# Patient Record
Sex: MALE | Race: BLACK OR AFRICAN AMERICAN | NOT HISPANIC OR LATINO | URBAN - METROPOLITAN AREA
[De-identification: names, ages, dates, MRNs, and addresses within clinical notes are randomized per-mention and may not be internally consistent; named-entity substitution may affect disease eponyms.]

---

## 2017-08-14 ENCOUNTER — OUTPATIENT (OUTPATIENT)
Dept: OUTPATIENT SERVICES | Facility: HOSPITAL | Age: 62
LOS: 1 days | Discharge: HOME | End: 2017-08-14
Payer: MEDICAID

## 2017-08-14 VITALS
WEIGHT: 294.98 LBS | HEART RATE: 62 BPM | TEMPERATURE: 97 F | RESPIRATION RATE: 17 BRPM | DIASTOLIC BLOOD PRESSURE: 103 MMHG | HEIGHT: 73.5 IN | SYSTOLIC BLOOD PRESSURE: 150 MMHG | OXYGEN SATURATION: 97 %

## 2017-08-14 DIAGNOSIS — I50.9 HEART FAILURE, UNSPECIFIED: ICD-10-CM

## 2017-08-14 DIAGNOSIS — M25.579 PAIN IN UNSPECIFIED ANKLE AND JOINTS OF UNSPECIFIED FOOT: ICD-10-CM

## 2017-08-14 DIAGNOSIS — M24.872 OTHER SPECIFIC JOINT DERANGEMENTS OF LEFT ANKLE, NOT ELSEWHERE CLASSIFIED: ICD-10-CM

## 2017-08-14 DIAGNOSIS — I10 ESSENTIAL (PRIMARY) HYPERTENSION: ICD-10-CM

## 2017-08-14 DIAGNOSIS — J45.909 UNSPECIFIED ASTHMA, UNCOMPLICATED: ICD-10-CM

## 2017-08-14 DIAGNOSIS — G47.30 SLEEP APNEA, UNSPECIFIED: ICD-10-CM

## 2017-08-14 DIAGNOSIS — N40.0 BENIGN PROSTATIC HYPERPLASIA WITHOUT LOWER URINARY TRACT SYMPTOMS: ICD-10-CM

## 2017-08-14 DIAGNOSIS — Z98.84 BARIATRIC SURGERY STATUS: Chronic | ICD-10-CM

## 2017-08-14 DIAGNOSIS — Z98.890 OTHER SPECIFIED POSTPROCEDURAL STATES: Chronic | ICD-10-CM

## 2017-08-14 DIAGNOSIS — Z01.818 ENCOUNTER FOR OTHER PREPROCEDURAL EXAMINATION: ICD-10-CM

## 2017-08-14 LAB
ANION GAP SERPL CALC-SCNC: 9 MMOL/L — SIGNIFICANT CHANGE UP (ref 5–17)
APTT BLD: 32.7 SEC — SIGNIFICANT CHANGE UP (ref 27.5–37.4)
BUN SERPL-MCNC: 15 MG/DL — SIGNIFICANT CHANGE UP (ref 7–23)
CALCIUM SERPL-MCNC: 8.6 MG/DL — SIGNIFICANT CHANGE UP (ref 8.5–10.1)
CHLORIDE SERPL-SCNC: 106 MMOL/L — SIGNIFICANT CHANGE UP (ref 96–108)
CO2 SERPL-SCNC: 28 MMOL/L — SIGNIFICANT CHANGE UP (ref 22–31)
CREAT SERPL-MCNC: 1.21 MG/DL — SIGNIFICANT CHANGE UP (ref 0.5–1.3)
EOSINOPHIL NFR BLD AUTO: 12 % — HIGH (ref 0–6)
GLUCOSE SERPL-MCNC: 106 MG/DL — HIGH (ref 70–99)
HCT VFR BLD CALC: 42.9 % — SIGNIFICANT CHANGE UP (ref 39–50)
HGB BLD-MCNC: 14 G/DL — SIGNIFICANT CHANGE UP (ref 13–17)
INR BLD: 0.97 RATIO — SIGNIFICANT CHANGE UP (ref 0.88–1.16)
LYMPHOCYTES # BLD AUTO: 35 % — SIGNIFICANT CHANGE UP (ref 13–44)
MCHC RBC-ENTMCNC: 30 PG — SIGNIFICANT CHANGE UP (ref 27–34)
MCHC RBC-ENTMCNC: 32.6 GM/DL — SIGNIFICANT CHANGE UP (ref 32–36)
MCV RBC AUTO: 91.8 FL — SIGNIFICANT CHANGE UP (ref 80–100)
MONOCYTES NFR BLD AUTO: 5 % — SIGNIFICANT CHANGE UP (ref 2–14)
NEUTROPHILS NFR BLD AUTO: 48 % — SIGNIFICANT CHANGE UP (ref 43–77)
PLATELET # BLD AUTO: 167 K/UL — SIGNIFICANT CHANGE UP (ref 150–400)
POTASSIUM SERPL-MCNC: 4.1 MMOL/L — SIGNIFICANT CHANGE UP (ref 3.5–5.3)
POTASSIUM SERPL-SCNC: 4.1 MMOL/L — SIGNIFICANT CHANGE UP (ref 3.5–5.3)
PROTHROM AB SERPL-ACNC: 10.6 SEC — SIGNIFICANT CHANGE UP (ref 9.8–12.7)
RBC # BLD: 4.67 M/UL — SIGNIFICANT CHANGE UP (ref 4.2–5.8)
RBC # FLD: 13.6 % — SIGNIFICANT CHANGE UP (ref 11–15)
SODIUM SERPL-SCNC: 143 MMOL/L — SIGNIFICANT CHANGE UP (ref 135–145)
WBC # BLD: 6 K/UL — SIGNIFICANT CHANGE UP (ref 3.8–10.5)
WBC # FLD AUTO: 6 K/UL — SIGNIFICANT CHANGE UP (ref 3.8–10.5)

## 2017-08-14 PROCEDURE — 93010 ELECTROCARDIOGRAM REPORT: CPT | Mod: NC

## 2017-08-14 RX ORDER — COLCHICINE 0.6 MG
1 TABLET ORAL
Qty: 0 | Refills: 0 | COMMUNITY

## 2017-08-14 RX ORDER — PREGABALIN 225 MG/1
1 CAPSULE ORAL
Qty: 0 | Refills: 0 | COMMUNITY

## 2017-08-14 RX ORDER — CHOLECALCIFEROL (VITAMIN D3) 125 MCG
1 CAPSULE ORAL
Qty: 0 | Refills: 0 | COMMUNITY

## 2017-08-14 RX ORDER — ISOSORBIDE MONONITRATE 60 MG/1
1 TABLET, EXTENDED RELEASE ORAL
Qty: 0 | Refills: 0 | COMMUNITY

## 2017-08-14 RX ORDER — LOSARTAN POTASSIUM 100 MG/1
1 TABLET, FILM COATED ORAL
Qty: 0 | Refills: 0 | COMMUNITY

## 2017-08-14 RX ORDER — MIRABEGRON 50 MG/1
1 TABLET, EXTENDED RELEASE ORAL
Qty: 0 | Refills: 0 | COMMUNITY

## 2017-08-14 RX ORDER — ALBUTEROL 90 UG/1
2 AEROSOL, METERED ORAL
Qty: 0 | Refills: 0 | COMMUNITY

## 2017-08-14 RX ORDER — PREDNISOLONE SODIUM PHOSPHATE 1 %
1 DROPS OPHTHALMIC (EYE)
Qty: 0 | Refills: 0 | COMMUNITY

## 2017-08-14 RX ORDER — ALLOPURINOL 300 MG
1 TABLET ORAL
Qty: 0 | Refills: 0 | COMMUNITY

## 2017-08-14 RX ORDER — BRIMONIDINE TARTRATE 2 MG/MG
1 SOLUTION/ DROPS OPHTHALMIC
Qty: 0 | Refills: 0 | COMMUNITY

## 2017-08-14 RX ORDER — FUROSEMIDE 40 MG
1 TABLET ORAL
Qty: 0 | Refills: 0 | COMMUNITY

## 2017-08-14 RX ORDER — FINASTERIDE 5 MG/1
1 TABLET, FILM COATED ORAL
Qty: 0 | Refills: 0 | COMMUNITY

## 2017-08-14 RX ORDER — ASPIRIN/CALCIUM CARB/MAGNESIUM 324 MG
1 TABLET ORAL
Qty: 0 | Refills: 0 | COMMUNITY

## 2017-08-14 RX ORDER — POTASSIUM CHLORIDE 20 MEQ
1 PACKET (EA) ORAL
Qty: 0 | Refills: 0 | COMMUNITY

## 2017-08-14 RX ORDER — AMLODIPINE BESYLATE 2.5 MG/1
1 TABLET ORAL
Qty: 0 | Refills: 0 | COMMUNITY

## 2017-08-14 RX ORDER — DORZOLAMIDE HYDROCHLORIDE 20 MG/ML
1 SOLUTION/ DROPS OPHTHALMIC
Qty: 0 | Refills: 0 | COMMUNITY

## 2017-08-14 RX ORDER — METOPROLOL TARTRATE 50 MG
1 TABLET ORAL
Qty: 0 | Refills: 0 | COMMUNITY

## 2017-08-14 NOTE — H&P PST ADULT - PMH
Asthma    BPH without obstruction/lower urinary tract symptoms    CHF (congestive heart failure)    Glaucoma    HTN (hypertension)    Obese    Obesity (BMI 30-39.9)    Sleep apnea

## 2017-08-21 ENCOUNTER — RESULT REVIEW (OUTPATIENT)
Age: 62
End: 2017-08-21

## 2017-08-21 ENCOUNTER — OUTPATIENT (OUTPATIENT)
Dept: OUTPATIENT SERVICES | Facility: HOSPITAL | Age: 62
LOS: 1 days | Discharge: HOME | End: 2017-08-21
Payer: MEDICAID

## 2017-08-21 VITALS
SYSTOLIC BLOOD PRESSURE: 133 MMHG | TEMPERATURE: 97 F | WEIGHT: 283.96 LBS | HEIGHT: 73 IN | OXYGEN SATURATION: 97 % | HEART RATE: 52 BPM | DIASTOLIC BLOOD PRESSURE: 81 MMHG | RESPIRATION RATE: 18 BRPM

## 2017-08-21 VITALS
OXYGEN SATURATION: 98 % | TEMPERATURE: 97 F | RESPIRATION RATE: 15 BRPM | SYSTOLIC BLOOD PRESSURE: 121 MMHG | HEART RATE: 60 BPM | DIASTOLIC BLOOD PRESSURE: 79 MMHG

## 2017-08-21 DIAGNOSIS — N40.0 BENIGN PROSTATIC HYPERPLASIA WITHOUT LOWER URINARY TRACT SYMPTOMS: Chronic | ICD-10-CM

## 2017-08-21 DIAGNOSIS — Z98.890 OTHER SPECIFIED POSTPROCEDURAL STATES: Chronic | ICD-10-CM

## 2017-08-21 DIAGNOSIS — Z98.84 BARIATRIC SURGERY STATUS: Chronic | ICD-10-CM

## 2017-08-21 PROCEDURE — 88304 TISSUE EXAM BY PATHOLOGIST: CPT | Mod: 26

## 2017-08-21 RX ORDER — SODIUM CHLORIDE 9 MG/ML
1000 INJECTION, SOLUTION INTRAVENOUS
Qty: 0 | Refills: 0 | Status: DISCONTINUED | OUTPATIENT
Start: 2017-08-21 | End: 2017-08-22

## 2017-08-21 RX ORDER — FENTANYL CITRATE 50 UG/ML
50 INJECTION INTRAVENOUS
Qty: 0 | Refills: 0 | Status: DISCONTINUED | OUTPATIENT
Start: 2017-08-21 | End: 2017-08-22

## 2017-08-21 RX ORDER — ACETAMINOPHEN 500 MG
1000 TABLET ORAL ONCE
Qty: 0 | Refills: 0 | Status: COMPLETED | OUTPATIENT
Start: 2017-08-21 | End: 2017-08-21

## 2017-08-21 RX ORDER — FENTANYL CITRATE 50 UG/ML
25 INJECTION INTRAVENOUS
Qty: 0 | Refills: 0 | Status: DISCONTINUED | OUTPATIENT
Start: 2017-08-21 | End: 2017-08-22

## 2017-08-21 RX ADMIN — Medication 400 MILLIGRAM(S): at 13:49

## 2017-08-21 RX ADMIN — Medication 1000 MILLIGRAM(S): at 14:26

## 2017-08-21 RX ADMIN — SODIUM CHLORIDE 75 MILLILITER(S): 9 INJECTION, SOLUTION INTRAVENOUS at 13:49

## 2017-08-21 NOTE — ASU PATIENT PROFILE, ADULT - PMH
Asthma    BPH without obstruction/lower urinary tract symptoms    CHF (congestive heart failure)    Glaucoma    Gout    HTN (hypertension)    Obese    Obesity (BMI 30-39.9)    Sleep apnea    Thyroid activity decreased    Thyroid activity decreased

## 2017-08-21 NOTE — ASU DISCHARGE PLAN (ADULT/PEDIATRIC). - NOTIFY
Pain not relieved by Medications/Numbness, color, or temperature change to extremity/Fever greater than 101/Bleeding that does not stop

## 2017-08-22 LAB — SURGICAL PATHOLOGY FINAL REPORT - CH: SIGNIFICANT CHANGE UP

## 2017-08-25 ENCOUNTER — TRANSCRIPTION ENCOUNTER (OUTPATIENT)
Age: 62
End: 2017-08-25

## 2017-08-28 DIAGNOSIS — E66.9 OBESITY, UNSPECIFIED: ICD-10-CM

## 2017-08-28 DIAGNOSIS — I10 ESSENTIAL (PRIMARY) HYPERTENSION: ICD-10-CM

## 2017-08-28 DIAGNOSIS — Z79.82 LONG TERM (CURRENT) USE OF ASPIRIN: ICD-10-CM

## 2017-08-28 DIAGNOSIS — M89.572: ICD-10-CM

## 2017-08-28 DIAGNOSIS — N40.0 BENIGN PROSTATIC HYPERPLASIA WITHOUT LOWER URINARY TRACT SYMPTOMS: ICD-10-CM

## 2017-08-28 DIAGNOSIS — J45.909 UNSPECIFIED ASTHMA, UNCOMPLICATED: ICD-10-CM

## 2017-08-28 DIAGNOSIS — M65.872 OTHER SYNOVITIS AND TENOSYNOVITIS, LEFT ANKLE AND FOOT: ICD-10-CM

## 2017-08-28 DIAGNOSIS — M25.572 PAIN IN LEFT ANKLE AND JOINTS OF LEFT FOOT: ICD-10-CM

## 2017-08-28 DIAGNOSIS — I50.9 HEART FAILURE, UNSPECIFIED: ICD-10-CM

## 2017-08-28 DIAGNOSIS — Z98.84 BARIATRIC SURGERY STATUS: ICD-10-CM

## 2017-08-28 DIAGNOSIS — M93.972 OSTEOCHONDROPATHY, UNSPECIFIED, LEFT ANKLE AND FOOT: ICD-10-CM

## 2017-08-28 DIAGNOSIS — G47.30 SLEEP APNEA, UNSPECIFIED: ICD-10-CM

## 2022-08-01 NOTE — ASU PATIENT PROFILE, ADULT - PRO TOBACCO TYPE
Quality 110: Preventive Care And Screening: Influenza Immunization: Influenza immunization was not ordered or administered, reason not given Quality 226: Preventive Care And Screening: Tobacco Use: Screening And Cessation Intervention: Patient screened for tobacco use and is an ex/non-smoker Quality 130: Documentation Of Current Medications In The Medical Record: Current Medications Documented Detail Level: Detailed Quality 431: Preventive Care And Screening: Unhealthy Alcohol Use - Screening: Patient not identified as an unhealthy alcohol user when screened for unhealthy alcohol use using a systematic screening method cigarettes

## 2023-06-22 PROBLEM — E66.9 OBESITY, UNSPECIFIED: Chronic | Status: ACTIVE | Noted: 2017-08-14

## 2023-06-22 PROBLEM — E03.9 HYPOTHYROIDISM, UNSPECIFIED: Chronic | Status: ACTIVE | Noted: 2017-08-21

## 2023-06-22 PROBLEM — N40.0 BENIGN PROSTATIC HYPERPLASIA WITHOUT LOWER URINARY TRACT SYMPTOMS: Chronic | Status: ACTIVE | Noted: 2017-08-14

## 2023-06-22 PROBLEM — G47.30 SLEEP APNEA, UNSPECIFIED: Chronic | Status: ACTIVE | Noted: 2017-08-14

## 2023-06-22 PROBLEM — H40.9 UNSPECIFIED GLAUCOMA: Chronic | Status: ACTIVE | Noted: 2017-08-14

## 2023-06-22 PROBLEM — I10 ESSENTIAL (PRIMARY) HYPERTENSION: Chronic | Status: ACTIVE | Noted: 2017-08-14

## 2023-06-22 PROBLEM — J45.909 UNSPECIFIED ASTHMA, UNCOMPLICATED: Chronic | Status: ACTIVE | Noted: 2017-08-14

## 2023-06-22 PROBLEM — I50.9 HEART FAILURE, UNSPECIFIED: Chronic | Status: ACTIVE | Noted: 2017-08-14

## 2023-06-22 PROBLEM — M10.9 GOUT, UNSPECIFIED: Chronic | Status: ACTIVE | Noted: 2017-08-21

## 2023-08-18 ENCOUNTER — APPOINTMENT (OUTPATIENT)
Dept: VASCULAR SURGERY | Facility: CLINIC | Age: 68
End: 2023-08-18
Payer: MEDICARE

## 2023-08-18 VITALS
HEIGHT: 74 IN | OXYGEN SATURATION: 97 % | BODY MASS INDEX: 35.42 KG/M2 | TEMPERATURE: 98.3 F | DIASTOLIC BLOOD PRESSURE: 88 MMHG | SYSTOLIC BLOOD PRESSURE: 147 MMHG | WEIGHT: 276 LBS | HEART RATE: 76 BPM

## 2023-08-18 DIAGNOSIS — M79.604 PAIN IN RIGHT LEG: ICD-10-CM

## 2023-08-18 PROCEDURE — 99205 OFFICE O/P NEW HI 60 MIN: CPT

## 2023-08-18 NOTE — CONSULT LETTER
[Dear  ___] : Dear  [unfilled], [Consult Letter:] : I had the pleasure of evaluating your patient, [unfilled]. [Please see my note below.] : Please see my note below. [Consult Closing:] : Thank you very much for allowing me to participate in the care of this patient.  If you have any questions, please do not hesitate to contact me. [Sincerely,] : Sincerely, [FreeTextEntry1] : Mina Disla MD vascular and wound  care surgeon [phone] [FreeTextEntry3] : Mina Disla MD vascular and wound  care surgeon [phone]

## 2023-08-18 NOTE — PLAN
[TextEntry] : The plan at this stage is to perform arterial and venous Dopplers of both lower extremity to rule out DVT rule out reflux and follow the patient after the about test are done.  There is no need for any medications at this stage and no medications are prescribed.

## 2023-08-18 NOTE — ASSESSMENT
[FreeTextEntry1] : The patient has a varicose veins of the left lower extremity which are asymptomatic and mild to moderate peripheral arterial disease.  The patient's symptoms of radiating pain both legs are most likely secondary to spinal stenosis

## 2023-08-18 NOTE — PHYSICAL EXAM
[Normal Breath Sounds] : Normal breath sounds [Normal Heart Sounds] : normal heart sounds [Right Carotid Bruit] : no bruit heard over the right carotid [Left Carotid Bruit] : no bruit heard over the left carotid [2+] : left 2+ [Left Femoral Bruit] : no bruit heard over the left femoral artery [Right Femoral Bruit] : no bruit heard over the right femoral artery [0] : right 0 [1+] : right 1+ [Ankle Swelling (On Exam)] : present [Ankle Swelling Bilaterally] : bilaterally  [Varicose Veins Of Lower Extremities] : bilaterally [Ankle Swelling On The Left] : moderate [] : bilaterally [Ankle Swelling On The Right] : mild [Abdomen Masses] : No abdominal masses [Tender] : nontender [Stool Sample Taken] : No stool obtained  on rectal exam [No Rash or Lesion] : No rash or lesion [Purpura] : no purpura  [Petechiae] : no petechiae [Skin Ulcer] : no ulcer [Skin Induration] : no induration [Alert] : alert [Oriented to Person] : oriented to person [Oriented to Place] : oriented to place [Oriented to Time] : oriented to time [Calm] : calm [de-identified] : Well-built well-nourished [de-identified] : Patient is legally blind [de-identified] : Within normal limits [TextEntry] : Patient is seen and examined in sitting and standing position.  Both her lower extremities have weakly palpable pedal pulses but feet are warm to touch the patient has a mild to moderate peripheral arterial disease which relatively asymptomatic.  Patient also has blindness in both eyes. Patient has a varicose veins on left lower extremity especially on the medial calf which is consistent with large varicosity without any evidence of bleeding or thrombophlebitis.  There is no tenderness and no concern for rupture or infection.  The patient also has edema of the both ankles when it minimal.

## 2023-08-18 NOTE — HISTORY OF PRESENT ILLNESS
[FreeTextEntry1] : This is a 67-year-old male who has been referred for evaluation of bilateral lower extremity varicose veins especially on the left lower extremity.  According to the history obtained from the patient he is a legally blind and has no history of diabetes but has also noticed increasing lump in varicose veins on the left medial calf is concerned and is sent here for evaluation.  Patient has no history of DVT in the past no history of claudications in the leg or rest pains.  Patient has minimal amount of swelling on the ankles and the feet

## 2023-08-18 NOTE — REVIEW OF SYSTEMS
[As Noted in HPI] : as noted in HPI [Lower Ext Edema] : lower extremity edema [Negative] : Heme/Lymph

## 2023-09-11 ENCOUNTER — APPOINTMENT (OUTPATIENT)
Dept: ULTRASOUND IMAGING | Facility: HOSPITAL | Age: 68
End: 2023-09-11

## 2023-09-11 ENCOUNTER — OUTPATIENT (OUTPATIENT)
Dept: OUTPATIENT SERVICES | Facility: HOSPITAL | Age: 68
LOS: 1 days | Discharge: HOME | End: 2023-09-11
Payer: MEDICARE

## 2023-09-11 DIAGNOSIS — Z98.890 OTHER SPECIFIED POSTPROCEDURAL STATES: Chronic | ICD-10-CM

## 2023-09-11 DIAGNOSIS — I73.9 PERIPHERAL VASCULAR DISEASE, UNSPECIFIED: ICD-10-CM

## 2023-09-11 DIAGNOSIS — Z98.84 BARIATRIC SURGERY STATUS: Chronic | ICD-10-CM

## 2023-09-11 DIAGNOSIS — N40.0 BENIGN PROSTATIC HYPERPLASIA WITHOUT LOWER URINARY TRACT SYMPTOMS: Chronic | ICD-10-CM

## 2023-09-11 DIAGNOSIS — I83.813 VARICOSE VEINS OF BILATERAL LOWER EXTREMITIES WITH PAIN: ICD-10-CM

## 2023-09-11 PROCEDURE — 93970 EXTREMITY STUDY: CPT | Mod: 26

## 2023-09-11 PROCEDURE — 93925 LOWER EXTREMITY STUDY: CPT | Mod: 26

## 2023-10-06 ENCOUNTER — APPOINTMENT (OUTPATIENT)
Dept: VASCULAR SURGERY | Facility: CLINIC | Age: 68
End: 2023-10-06
Payer: MEDICARE

## 2023-10-06 VITALS
WEIGHT: 276 LBS | DIASTOLIC BLOOD PRESSURE: 80 MMHG | HEIGHT: 74 IN | HEART RATE: 77 BPM | OXYGEN SATURATION: 94 % | BODY MASS INDEX: 35.42 KG/M2 | SYSTOLIC BLOOD PRESSURE: 122 MMHG | TEMPERATURE: 97.6 F

## 2023-10-06 DIAGNOSIS — I73.9 PERIPHERAL VASCULAR DISEASE, UNSPECIFIED: ICD-10-CM

## 2023-10-06 DIAGNOSIS — M79.605 PAIN IN LEFT LEG: ICD-10-CM

## 2023-10-06 DIAGNOSIS — I83.813 VARICOSE VEINS OF BILATERAL LOWER EXTREMITIES WITH PAIN: ICD-10-CM

## 2023-10-06 PROCEDURE — 99213 OFFICE O/P EST LOW 20 MIN: CPT

## 2024-02-10 ENCOUNTER — INPATIENT (INPATIENT)
Facility: HOSPITAL | Age: 69
LOS: 1 days | Discharge: HOME | End: 2024-02-12
Attending: STUDENT IN AN ORGANIZED HEALTH CARE EDUCATION/TRAINING PROGRAM | Admitting: STUDENT IN AN ORGANIZED HEALTH CARE EDUCATION/TRAINING PROGRAM
Payer: MEDICARE

## 2024-02-10 VITALS
WEIGHT: 276.9 LBS | HEART RATE: 75 BPM | OXYGEN SATURATION: 100 % | TEMPERATURE: 98 F | HEIGHT: 73 IN | SYSTOLIC BLOOD PRESSURE: 157 MMHG | RESPIRATION RATE: 20 BRPM | DIASTOLIC BLOOD PRESSURE: 98 MMHG

## 2024-02-10 DIAGNOSIS — Z98.84 BARIATRIC SURGERY STATUS: Chronic | ICD-10-CM

## 2024-02-10 DIAGNOSIS — H40.9 UNSPECIFIED GLAUCOMA: ICD-10-CM

## 2024-02-10 DIAGNOSIS — I10 ESSENTIAL (PRIMARY) HYPERTENSION: ICD-10-CM

## 2024-02-10 DIAGNOSIS — Z79.890 HORMONE REPLACEMENT THERAPY: ICD-10-CM

## 2024-02-10 DIAGNOSIS — Z98.890 OTHER SPECIFIED POSTPROCEDURAL STATES: Chronic | ICD-10-CM

## 2024-02-10 DIAGNOSIS — R06.02 SHORTNESS OF BREATH: ICD-10-CM

## 2024-02-10 DIAGNOSIS — J44.9 CHRONIC OBSTRUCTIVE PULMONARY DISEASE, UNSPECIFIED: ICD-10-CM

## 2024-02-10 DIAGNOSIS — E03.9 HYPOTHYROIDISM, UNSPECIFIED: ICD-10-CM

## 2024-02-10 DIAGNOSIS — E66.2 MORBID (SEVERE) OBESITY WITH ALVEOLAR HYPOVENTILATION: ICD-10-CM

## 2024-02-10 DIAGNOSIS — E83.42 HYPOMAGNESEMIA: ICD-10-CM

## 2024-02-10 DIAGNOSIS — J44.1 CHRONIC OBSTRUCTIVE PULMONARY DISEASE WITH (ACUTE) EXACERBATION: ICD-10-CM

## 2024-02-10 DIAGNOSIS — I11.0 HYPERTENSIVE HEART DISEASE WITH HEART FAILURE: ICD-10-CM

## 2024-02-10 DIAGNOSIS — N40.0 BENIGN PROSTATIC HYPERPLASIA WITHOUT LOWER URINARY TRACT SYMPTOMS: ICD-10-CM

## 2024-02-10 DIAGNOSIS — J45.909 UNSPECIFIED ASTHMA, UNCOMPLICATED: ICD-10-CM

## 2024-02-10 DIAGNOSIS — N40.0 BENIGN PROSTATIC HYPERPLASIA WITHOUT LOWER URINARY TRACT SYMPTOMS: Chronic | ICD-10-CM

## 2024-02-10 DIAGNOSIS — R06.09 OTHER FORMS OF DYSPNEA: ICD-10-CM

## 2024-02-10 DIAGNOSIS — R79.89 OTHER SPECIFIED ABNORMAL FINDINGS OF BLOOD CHEMISTRY: ICD-10-CM

## 2024-02-10 DIAGNOSIS — M10.9 GOUT, UNSPECIFIED: ICD-10-CM

## 2024-02-10 DIAGNOSIS — I50.9 HEART FAILURE, UNSPECIFIED: ICD-10-CM

## 2024-02-10 DIAGNOSIS — Z29.9 ENCOUNTER FOR PROPHYLACTIC MEASURES, UNSPECIFIED: ICD-10-CM

## 2024-02-10 DIAGNOSIS — E87.6 HYPOKALEMIA: ICD-10-CM

## 2024-02-10 LAB
ALBUMIN SERPL ELPH-MCNC: 3.4 G/DL — SIGNIFICANT CHANGE UP (ref 3.3–5)
ALP SERPL-CCNC: 56 U/L — SIGNIFICANT CHANGE UP (ref 40–120)
ALT FLD-CCNC: 31 U/L — SIGNIFICANT CHANGE UP (ref 12–78)
ANION GAP SERPL CALC-SCNC: 3 MMOL/L — LOW (ref 5–17)
ANION GAP SERPL CALC-SCNC: 3 MMOL/L — LOW (ref 5–17)
APTT BLD: 33.8 SEC — SIGNIFICANT CHANGE UP (ref 24.5–35.6)
AST SERPL-CCNC: 30 U/L — SIGNIFICANT CHANGE UP (ref 15–37)
BASOPHILS # BLD AUTO: 0.05 K/UL — SIGNIFICANT CHANGE UP (ref 0–0.2)
BASOPHILS NFR BLD AUTO: 1.1 % — SIGNIFICANT CHANGE UP (ref 0–2)
BILIRUB SERPL-MCNC: 1.6 MG/DL — HIGH (ref 0.2–1.2)
BUN SERPL-MCNC: 15 MG/DL — SIGNIFICANT CHANGE UP (ref 7–23)
BUN SERPL-MCNC: 17 MG/DL — SIGNIFICANT CHANGE UP (ref 7–23)
CALCIUM SERPL-MCNC: 8 MG/DL — LOW (ref 8.5–10.1)
CALCIUM SERPL-MCNC: 8.2 MG/DL — LOW (ref 8.5–10.1)
CHLORIDE SERPL-SCNC: 108 MMOL/L — SIGNIFICANT CHANGE UP (ref 96–108)
CHLORIDE SERPL-SCNC: 109 MMOL/L — HIGH (ref 96–108)
CO2 SERPL-SCNC: 30 MMOL/L — SIGNIFICANT CHANGE UP (ref 22–31)
CO2 SERPL-SCNC: 31 MMOL/L — SIGNIFICANT CHANGE UP (ref 22–31)
CREAT SERPL-MCNC: 1.08 MG/DL — SIGNIFICANT CHANGE UP (ref 0.5–1.3)
CREAT SERPL-MCNC: 1.18 MG/DL — SIGNIFICANT CHANGE UP (ref 0.5–1.3)
D DIMER BLD IA.RAPID-MCNC: <150 NG/ML DDU — SIGNIFICANT CHANGE UP
EGFR: 68 ML/MIN/1.73M2 — SIGNIFICANT CHANGE UP
EGFR: 76 ML/MIN/1.73M2 — SIGNIFICANT CHANGE UP
EOSINOPHIL # BLD AUTO: 0.49 K/UL — SIGNIFICANT CHANGE UP (ref 0–0.5)
EOSINOPHIL NFR BLD AUTO: 10.7 % — HIGH (ref 0–6)
GLUCOSE SERPL-MCNC: 89 MG/DL — SIGNIFICANT CHANGE UP (ref 70–99)
GLUCOSE SERPL-MCNC: 95 MG/DL — SIGNIFICANT CHANGE UP (ref 70–99)
HCT VFR BLD CALC: 34.7 % — LOW (ref 39–50)
HGB BLD-MCNC: 11.5 G/DL — LOW (ref 13–17)
IMM GRANULOCYTES NFR BLD AUTO: 0.2 % — SIGNIFICANT CHANGE UP (ref 0–0.9)
INR BLD: 0.98 RATIO — SIGNIFICANT CHANGE UP (ref 0.85–1.18)
LYMPHOCYTES # BLD AUTO: 1.5 K/UL — SIGNIFICANT CHANGE UP (ref 1–3.3)
LYMPHOCYTES # BLD AUTO: 32.8 % — SIGNIFICANT CHANGE UP (ref 13–44)
MAGNESIUM SERPL-MCNC: 1.3 MG/DL — LOW (ref 1.6–2.6)
MCHC RBC-ENTMCNC: 30.2 PG — SIGNIFICANT CHANGE UP (ref 27–34)
MCHC RBC-ENTMCNC: 33.1 G/DL — SIGNIFICANT CHANGE UP (ref 32–36)
MCV RBC AUTO: 91.1 FL — SIGNIFICANT CHANGE UP (ref 80–100)
MONOCYTES # BLD AUTO: 0.32 K/UL — SIGNIFICANT CHANGE UP (ref 0–0.9)
MONOCYTES NFR BLD AUTO: 7 % — SIGNIFICANT CHANGE UP (ref 2–14)
NEUTROPHILS # BLD AUTO: 2.2 K/UL — SIGNIFICANT CHANGE UP (ref 1.8–7.4)
NEUTROPHILS NFR BLD AUTO: 48.2 % — SIGNIFICANT CHANGE UP (ref 43–77)
NRBC # BLD: 0 /100 WBCS — SIGNIFICANT CHANGE UP (ref 0–0)
NT-PROBNP SERPL-SCNC: 301 PG/ML — HIGH (ref 0–125)
PLATELET # BLD AUTO: 162 K/UL — SIGNIFICANT CHANGE UP (ref 150–400)
POTASSIUM SERPL-MCNC: 2.9 MMOL/L — CRITICAL LOW (ref 3.5–5.3)
POTASSIUM SERPL-MCNC: 3.7 MMOL/L — SIGNIFICANT CHANGE UP (ref 3.5–5.3)
POTASSIUM SERPL-SCNC: 2.9 MMOL/L — CRITICAL LOW (ref 3.5–5.3)
POTASSIUM SERPL-SCNC: 3.7 MMOL/L — SIGNIFICANT CHANGE UP (ref 3.5–5.3)
PROT SERPL-MCNC: 7 GM/DL — SIGNIFICANT CHANGE UP (ref 6–8.3)
PROTHROM AB SERPL-ACNC: 11.7 SEC — SIGNIFICANT CHANGE UP (ref 9.5–13)
RBC # BLD: 3.81 M/UL — LOW (ref 4.2–5.8)
RBC # FLD: 14.6 % — HIGH (ref 10.3–14.5)
SODIUM SERPL-SCNC: 142 MMOL/L — SIGNIFICANT CHANGE UP (ref 135–145)
SODIUM SERPL-SCNC: 142 MMOL/L — SIGNIFICANT CHANGE UP (ref 135–145)
TROPONIN I, HIGH SENSITIVITY RESULT: 101.3 NG/L — HIGH
TROPONIN I, HIGH SENSITIVITY RESULT: 96.3 NG/L — HIGH
WBC # BLD: 4.57 K/UL — SIGNIFICANT CHANGE UP (ref 3.8–10.5)
WBC # FLD AUTO: 4.57 K/UL — SIGNIFICANT CHANGE UP (ref 3.8–10.5)

## 2024-02-10 PROCEDURE — 71275 CT ANGIOGRAPHY CHEST: CPT | Mod: 26,MA

## 2024-02-10 PROCEDURE — 99285 EMERGENCY DEPT VISIT HI MDM: CPT

## 2024-02-10 PROCEDURE — 71045 X-RAY EXAM CHEST 1 VIEW: CPT | Mod: 26

## 2024-02-10 PROCEDURE — 93010 ELECTROCARDIOGRAM REPORT: CPT

## 2024-02-10 PROCEDURE — 99222 1ST HOSP IP/OBS MODERATE 55: CPT

## 2024-02-10 RX ORDER — BRIMONIDINE TARTRATE 2 MG/MG
1 SOLUTION/ DROPS OPHTHALMIC THREE TIMES A DAY
Refills: 0 | Status: DISCONTINUED | OUTPATIENT
Start: 2024-02-10 | End: 2024-02-12

## 2024-02-10 RX ORDER — LEVOTHYROXINE SODIUM 125 MCG
75 TABLET ORAL DAILY
Refills: 0 | Status: DISCONTINUED | OUTPATIENT
Start: 2024-02-10 | End: 2024-02-10

## 2024-02-10 RX ORDER — METOPROLOL TARTRATE 50 MG
50 TABLET ORAL DAILY
Refills: 0 | Status: DISCONTINUED | OUTPATIENT
Start: 2024-02-10 | End: 2024-02-10

## 2024-02-10 RX ORDER — LATANOPROST 0.05 MG/ML
1 SOLUTION/ DROPS OPHTHALMIC; TOPICAL AT BEDTIME
Refills: 0 | Status: DISCONTINUED | OUTPATIENT
Start: 2024-02-10 | End: 2024-02-12

## 2024-02-10 RX ORDER — HEPARIN SODIUM 5000 [USP'U]/ML
5000 INJECTION INTRAVENOUS; SUBCUTANEOUS EVERY 12 HOURS
Refills: 0 | Status: DISCONTINUED | OUTPATIENT
Start: 2024-02-10 | End: 2024-02-12

## 2024-02-10 RX ORDER — POTASSIUM CHLORIDE 20 MEQ
40 PACKET (EA) ORAL ONCE
Refills: 0 | Status: COMPLETED | OUTPATIENT
Start: 2024-02-10 | End: 2024-02-10

## 2024-02-10 RX ORDER — LATANOPROST 0.05 MG/ML
1 SOLUTION/ DROPS OPHTHALMIC; TOPICAL
Refills: 0 | DISCHARGE

## 2024-02-10 RX ORDER — ALLOPURINOL 300 MG
100 TABLET ORAL DAILY
Refills: 0 | Status: DISCONTINUED | OUTPATIENT
Start: 2024-02-10 | End: 2024-02-12

## 2024-02-10 RX ORDER — MAGNESIUM SULFATE 500 MG/ML
1 VIAL (ML) INJECTION ONCE
Refills: 0 | Status: COMPLETED | OUTPATIENT
Start: 2024-02-10 | End: 2024-02-10

## 2024-02-10 RX ORDER — PREDNISOLONE SODIUM PHOSPHATE 1 %
1 DROPS OPHTHALMIC (EYE) EVERY 6 HOURS
Refills: 0 | Status: DISCONTINUED | OUTPATIENT
Start: 2024-02-10 | End: 2024-02-10

## 2024-02-10 RX ORDER — ASPIRIN/CALCIUM CARB/MAGNESIUM 324 MG
81 TABLET ORAL DAILY
Refills: 0 | Status: DISCONTINUED | OUTPATIENT
Start: 2024-02-10 | End: 2024-02-12

## 2024-02-10 RX ORDER — POTASSIUM CHLORIDE 20 MEQ
10 PACKET (EA) ORAL
Refills: 0 | Status: COMPLETED | OUTPATIENT
Start: 2024-02-10 | End: 2024-02-10

## 2024-02-10 RX ORDER — LEVOTHYROXINE SODIUM 125 MCG
175 TABLET ORAL DAILY
Refills: 0 | Status: DISCONTINUED | OUTPATIENT
Start: 2024-02-10 | End: 2024-02-12

## 2024-02-10 RX ORDER — DORZOLAMIDE HYDROCHLORIDE 20 MG/ML
1 SOLUTION/ DROPS OPHTHALMIC THREE TIMES A DAY
Refills: 0 | Status: DISCONTINUED | OUTPATIENT
Start: 2024-02-10 | End: 2024-02-12

## 2024-02-10 RX ORDER — ISOSORBIDE MONONITRATE 60 MG/1
30 TABLET, EXTENDED RELEASE ORAL DAILY
Refills: 0 | Status: DISCONTINUED | OUTPATIENT
Start: 2024-02-10 | End: 2024-02-12

## 2024-02-10 RX ORDER — AMLODIPINE BESYLATE 2.5 MG/1
5 TABLET ORAL DAILY
Refills: 0 | Status: DISCONTINUED | OUTPATIENT
Start: 2024-02-10 | End: 2024-02-12

## 2024-02-10 RX ORDER — ALBUTEROL 90 UG/1
2 AEROSOL, METERED ORAL EVERY 6 HOURS
Refills: 0 | Status: DISCONTINUED | OUTPATIENT
Start: 2024-02-10 | End: 2024-02-12

## 2024-02-10 RX ORDER — BUDESONIDE AND FORMOTEROL FUMARATE DIHYDRATE 160; 4.5 UG/1; UG/1
2 AEROSOL RESPIRATORY (INHALATION)
Refills: 0 | Status: DISCONTINUED | OUTPATIENT
Start: 2024-02-10 | End: 2024-02-12

## 2024-02-10 RX ORDER — FINASTERIDE 5 MG/1
5 TABLET, FILM COATED ORAL DAILY
Refills: 0 | Status: DISCONTINUED | OUTPATIENT
Start: 2024-02-10 | End: 2024-02-12

## 2024-02-10 RX ORDER — METOPROLOL TARTRATE 50 MG
50 TABLET ORAL
Refills: 0 | Status: DISCONTINUED | OUTPATIENT
Start: 2024-02-10 | End: 2024-02-12

## 2024-02-10 RX ORDER — BRIMONIDINE TARTRATE 2 MG/MG
1 SOLUTION/ DROPS OPHTHALMIC
Refills: 0 | DISCHARGE

## 2024-02-10 RX ORDER — IPRATROPIUM/ALBUTEROL SULFATE 18-103MCG
3 AEROSOL WITH ADAPTER (GRAM) INHALATION ONCE
Refills: 0 | Status: COMPLETED | OUTPATIENT
Start: 2024-02-10 | End: 2024-02-10

## 2024-02-10 RX ORDER — LEVOTHYROXINE SODIUM 125 MCG
1 TABLET ORAL
Refills: 0 | DISCHARGE

## 2024-02-10 RX ORDER — FUROSEMIDE 40 MG
40 TABLET ORAL DAILY
Refills: 0 | Status: DISCONTINUED | OUTPATIENT
Start: 2024-02-10 | End: 2024-02-12

## 2024-02-10 RX ADMIN — Medication 3 MILLILITER(S): at 09:29

## 2024-02-10 RX ADMIN — Medication 40 MILLIGRAM(S): at 14:45

## 2024-02-10 RX ADMIN — ISOSORBIDE MONONITRATE 30 MILLIGRAM(S): 60 TABLET, EXTENDED RELEASE ORAL at 15:52

## 2024-02-10 RX ADMIN — Medication 100 MILLIEQUIVALENT(S): at 11:02

## 2024-02-10 RX ADMIN — Medication 81 MILLIGRAM(S): at 14:45

## 2024-02-10 RX ADMIN — Medication 40 MILLIEQUIVALENT(S): at 10:11

## 2024-02-10 RX ADMIN — FINASTERIDE 5 MILLIGRAM(S): 5 TABLET, FILM COATED ORAL at 14:46

## 2024-02-10 RX ADMIN — HEPARIN SODIUM 5000 UNIT(S): 5000 INJECTION INTRAVENOUS; SUBCUTANEOUS at 17:04

## 2024-02-10 RX ADMIN — AMLODIPINE BESYLATE 5 MILLIGRAM(S): 2.5 TABLET ORAL at 14:45

## 2024-02-10 RX ADMIN — BRIMONIDINE TARTRATE 1 DROP(S): 2 SOLUTION/ DROPS OPHTHALMIC at 14:47

## 2024-02-10 RX ADMIN — Medication 100 MILLIGRAM(S): at 14:46

## 2024-02-10 RX ADMIN — DORZOLAMIDE HYDROCHLORIDE 1 DROP(S): 20 SOLUTION/ DROPS OPHTHALMIC at 22:31

## 2024-02-10 RX ADMIN — Medication 100 MILLIEQUIVALENT(S): at 10:11

## 2024-02-10 RX ADMIN — Medication 10 MILLIEQUIVALENT(S): at 13:30

## 2024-02-10 RX ADMIN — BRIMONIDINE TARTRATE 1 DROP(S): 2 SOLUTION/ DROPS OPHTHALMIC at 22:31

## 2024-02-10 RX ADMIN — DORZOLAMIDE HYDROCHLORIDE 1 DROP(S): 20 SOLUTION/ DROPS OPHTHALMIC at 14:47

## 2024-02-10 RX ADMIN — Medication 50 MILLIGRAM(S): at 17:03

## 2024-02-10 RX ADMIN — LATANOPROST 1 DROP(S): 0.05 SOLUTION/ DROPS OPHTHALMIC; TOPICAL at 22:37

## 2024-02-10 RX ADMIN — Medication 175 MICROGRAM(S): at 15:52

## 2024-02-10 RX ADMIN — BUDESONIDE AND FORMOTEROL FUMARATE DIHYDRATE 2 PUFF(S): 160; 4.5 AEROSOL RESPIRATORY (INHALATION) at 17:03

## 2024-02-10 RX ADMIN — Medication 100 GRAM(S): at 14:45

## 2024-02-10 RX ADMIN — Medication 100 MILLIEQUIVALENT(S): at 12:19

## 2024-02-10 NOTE — ED ADULT NURSE NOTE - CHIEF COMPLAINT QUOTE
SOB increasing since the last few weeks Dx with Covid 12/31 sent with a note from Dr Geronimo R/O PE denies Chest Pain

## 2024-02-10 NOTE — ED PROVIDER NOTE - NSICDXPASTSURGICALHX_GEN_ALL_CORE_FT
PAST SURGICAL HISTORY:  Enlarged prostate     H/O bariatric surgery 2016    S/P LASIK surgery of both eyes

## 2024-02-10 NOTE — ED ADULT TRIAGE NOTE - CHIEF COMPLAINT QUOTE
SOB increasing since the last few weeks Dx with Covid 12/31 sent with a note from Dr Geronimo R/O PE SOB increasing since the last few weeks Dx with Covid 12/31 sent with a note from Dr Geronimo R/O PE denies Chest Pain

## 2024-02-10 NOTE — PATIENT PROFILE ADULT - FALL HARM RISK - HARM RISK INTERVENTIONS

## 2024-02-10 NOTE — ED PROVIDER NOTE - PROGRESS NOTE DETAILS
ALBA Monzon: Workup reviewed - CMP w/ hypoK+/hypoMag, troponin 96, . CTA Chest w/ No PE.  Few sub-4 mm pulmonary nodules - Pt aware of this finding by PMD. Discussed patient case w/ Medicine Team - pt accepted for admission to [facility] w/ Dr. Kim. ALBA Monzon: Workup reviewed - CMP w/ hypoK+/hypoMag, troponin 96, . CTA Chest w/ No PE. Few sub-4 mm pulmonary nodules - Pt aware of this finding by PMD. Discussed patient case w/ Medicine Team - pt accepted for admission to [facility] w/ Dr. Kim.

## 2024-02-10 NOTE — H&P ADULT - NSHPPHYSICALEXAM_GEN_ALL_CORE
Physical exam:  General: patient in no acute distress, resting comfortably  Head:  Atraumatic, Normocephalic  Eyes: EOMI, PERRLA, clear sclera  Neck: Supple, thyroid nontender, non enlarged  Cardio: S1/S2 +ve, regular rate and rhythm, no M/G/R  Resp: decreased air entry, no rales or wheezing   GI: abdomen soft, nontender, non distended, no guarding, BS +ve x 4  Ext: trace pedal edema  Neuro: CN 2-12 intact, no significant motor or sensory deficits.  Skin: No rashes or lesions

## 2024-02-10 NOTE — ED PROVIDER NOTE - NSICDXPASTMEDICALHX_GEN_ALL_CORE_FT
PAST MEDICAL HISTORY:  Asthma     BPH without obstruction/lower urinary tract symptoms     CHF (congestive heart failure)     Glaucoma     Gout     HTN (hypertension)     Obese     Obesity (BMI 30-39.9)     Sleep apnea     Thyroid activity decreased     Thyroid activity decreased

## 2024-02-10 NOTE — H&P ADULT - PROBLEM SELECTOR PLAN 1
Dyspnea likely multifactorial - COPD + REBECCA + OHS  Patient without significant rales or wheezing.  No infiltrates on imaging  CT chest: No pulmonary embolism, with limited evaluation of several subsegmental branches.  Admit to tele - eval for home O2.  Consider pulm eval  Continue symbicort, albuterol prn    CT also found lung nodules - discussed with patient and wife.  With patient's smoking history - will need monitoring.  Follows pulm outpatient

## 2024-02-10 NOTE — ED CLERICAL - BED REQUESTED
Jenny Nayak is a 25 year old woman who presents today for routine gynecologic exam.  She is sexually active. Patient is currently using birth control pills for birth control. In regards to her menstrual cycles, Jenny reports no issues. She has minimal if any  symptoms of dysmenorrhea.    OBSTETRIC/GYNECOLOGIC HISTORY:        OB History    Para Term  AB Living   0 0 0 0 0 0   SAB TAB Ectopic Molar Multiple Live Births   0 0 0 0 0 0    No LMP recorded.      Pap smears: ASCUS with negative HPV from 2017.    PAST MEDICAL HISTORY:    Past Medical History:   Diagnosis Date   • ASCUS of cervix with negative high risk HPV 2017   • Migraine     no aura     PAST SURGICAL HISTORY:   Past Surgical History:   Procedure Laterality Date   • Hpv 9 valent vacc  2013    x 3   • No past surgeries       SOCIAL HISTORY:   Social History     Socioeconomic History   • Marital status: Single     Spouse name: Not on file   • Number of children: 0   • Years of education: Not on file   • Highest education level: Not on file   Occupational History   • Occupation: Student     Comment: psychology-grad school [employer]   • Occupation: 2018-undergraduate degree in psych from [employer]   Social Needs   • Financial resource strain: Not on file   • Food insecurity:     Worry: Not on file     Inability: Not on file   • Transportation needs:     Medical: Not on file     Non-medical: Not on file   Tobacco Use   • Smoking status: Never Smoker   • Smokeless tobacco: Never Used   Substance and Sexual Activity   • Alcohol use: No     Alcohol/week: 0.0 standard drinks   • Drug use: No   • Sexual activity: Yes     Partners: Male     Birth control/protection: Condom, Pill   Lifestyle   • Physical activity:     Days per week: Not on file     Minutes per session: Not on file   • Stress: Not on file   Relationships   • Social connections:     Talks on phone: Not on file     Gets together: Not on file     Attends [employer] service: Not  10-Feb-2024 13:04 on file     Active member of club or organization: Not on file     Attends meetings of clubs or organizations: Not on file     Relationship status: Not on file   • Intimate partner violence:     Fear of current or ex partner: Not on file     Emotionally abused: Not on file     Physically abused: Not on file     Forced sexual activity: Not on file   Other Topics Concern   • Not on file   Social History Narrative   • Not on file     Review of patient's social economics indicates:   Student                 Comment: psychology-grad school [employer]   2018-undergraduate degree in psych from [employer]               Patient denies a history of domestic violence.   FAMILY HISTORY:    Family History   Problem Relation Age of Onset   • Cancer Other         2018 neg fam hx for col, ut, ov, br CA     REVIEW OF SYSTEMS:  CONSTITUTIONAL: Denies fever/sweats and unintentional weight change.  CARDIOVASCULAR:  Denies chest discomfort with exertion, shortness of breath or palpitations.   RESPIRATORY: Denies cough, shortness of breath,change in exercise tolerance.   GASTROINTESTINAL:  Denies abdominal pain, nausea, change in bowel habits, melena.   GENITOURINARY: Denies frequency, dysuria.  MUSCULOSKELETAL: Denies joint pain, muscle aches.   SKIN:  Denies concerns, changing moles.   NEUROLOGIC:  Denies changes.  PSYCHIATRIC: Denies changes.  HEMATOLOGIC/LYMPHATIC: Denies abnormal bruising or bleeding,lumps or bumps.     OBJECTIVE:  VITALS: There were no vitals taken for this visit.   GENERAL: Alert & oriented x 3, mood and affect are appropriate.   HEENT: Neck is supple with no lymphadenopathy. Thyroid is normal size  SKIN: Warm and moist without erythema or new lesions  BREASTS: Without masses or nipple discharge bilaterally. Breast self-examination reviewed and encouraged.  ABDOMEN: Soft, non-tender, no masses or distention present, no hepatosplenomegaly, no hernias present   EXTREMITIES: No erythema, no edema  LYMPH: No palpable  neck, axillary or groin nodes  GYNECOLOGIC: External genitalia show no lesions  URETHRAL MEATUS: No polyps and well supported  LABIA MINORA/MAJORA: Normal, no lesions, atrophy absent  SKENE'S GLANDS: No erythema present  VAGINA:  Normal mucosa  CERVIX: Normal and without  lesions or masses   UTERUS: Normal size, mobile , non-tender, anteverted  ADNEXA: No masses  and no tenderness  RECTAL: No external hemorrhoids      IMPRESSION:  > Normal gynecologic exam  > Good results with birth control pills    PLAN:  > I discussed with the patient the most recent guidelines regarding Pap screening. Next pap due 8/2020 per ASCCP guidelines. I also discussed safe sex practices and the need to use birth control in order to prevent pregnancy. She is also aware that she still needs annual exams.     > STD (Sexually transmitted disease) screening performed: not performed per patient request  We discussed safe sex practices and reinforced need for condom use to prevent transmission of STDs and HIV.    > Gardasil Vaccination:  Already had.    >Prescription given for Junel FE 1/20 x 1 year    > Return to clinic 1 year or as needed.

## 2024-02-10 NOTE — H&P ADULT - ASSESSMENT
Patient is a 68M with a PMH of Asthma/COPD, CHF, CAD, Glaucoma,   Gout, H/O bariatric surgery, BPH, HLD, HTN, Hypothyroidism (secondary to radioactive iodine, Legally blind, Obesity, and REBECCA on CPAP who presents to the ED for worsening dyspnea on exertion.  Patient states that at the end of last year he had COVID 19 and developed significant dyspnea.  Patient reports that now his breathing is not back to baseline.  Patients states that before infection he could walk about 5 blocks without difficulty, now has to stop after walking 1-2 blocks.  Also notes increased dyspnea when climbing stairs.  Seen his PCP yesterday who recommended he come to ED to eval for PE.  Denies history of chest pain.  States he has chronic cough that sometimes produces white phlegm.  Admits to chronic smoking history - smoked for about 25 years, quit 15 years ago.  Wife still actively smokes cigarettes.  Patient also states that he was on home oxygen for a time but that stopped around 10 years.  Follows with PCP and Pulm outpatient.  Compliant with CPAP at night.  No other complaints.  Vitals stable, SpO2 currently 100% on RA.  Labs show elevated troponins, hypokalemia, and hypomagnesimemia.  Will admit to tele.

## 2024-02-10 NOTE — ED ADULT NURSE NOTE - OBJECTIVE STATEMENT
pt is aox4 present to the ED c/o SOB increasing since the last few weeks Dx with Covid 12/31 sent with a note from Dr Geronimo R/O PE denies Chest Pain. states sob is worst with activity. history of htn, sleep apnea, high cholesterol and thyroid disorder.

## 2024-02-10 NOTE — PATIENT PROFILE ADULT - HAVE YOU EXPERIENCED VIOLENCE OR A TRAUMATIC EVENT?
Thais Crespo comes in today for complained that she is having lot of leg swellings since last few days.  She is not eating much salty food.  She is not feeling depressed or anything but she wakes up early in the morning at 3:00 a.m. and cannot fall asleep.  Complains of some increased urination issue with urgency mainly.  She cannot hold the urine when she has to go and sometime leaks out.  Other than that she was put on some medication as she went to the emergency room yesterday for this leg swelling.  No shortness of breath, chest pain, palpitation.    ALLERGIES:   Allergen Reactions   • Flexeril [Cyclobenzaprine Hcl] VOMITING   • Morphine Sulfate ANXIETY   • Statins MYALGIA   • Varenicline VOMITING     CHANTIX        Current Outpatient Medications   Medication Sig   • diclofenac (VOLTAREN) 1 % gel    • furosemide (LASIX) 20 MG tablet    • HYDROcodone-acetaminophen (NORCO) 5-325 MG per tablet Take 1 tablet by mouth every 4 hours as needed.   • bumetanide (BUMEX) 1 MG tablet Take 1 tablet by mouth daily.   • solifenacin (VESICARE) 10 MG tablet Take 1 tablet by mouth daily.   • triamcinolone (ARISTOCORT) 0.1 % cream Apply topically to legs 2 times a day (itching) *may keep at bedside*   • amLODIPine (NORVASC) 10 MG tablet Take 1 tablet by mouth in the morning (high blood pressure)   • venlafaxine XR (EFFEXOR XR) 75 MG 24 hr capsule Take 1 capsule by mouth in the morning   • omeprazole 20 MG tablet Take 2 tablets (40mg) by mouth in the morning (gerd)   • hydrALAZINE (APRESOLINE) 50 MG tablet Take 1 tablet by mouth 3 times a day (hypertension)   • Aspirin Low Dose 81 MG EC tablet Take 1 tablet by mouth in the morning (heart health)   • Eliquis 5 MG Tab Take 1 tablet by mouth 2 times a day (dvt)   • potassium CHLORIDE (KLOR-CON M) 20 MEQ carlos ER tablet Take 1 tablet by mouth daily.   • zolpidem (AMBIEN) 10 MG tablet Take 1 tablet by mouth nightly as needed for Sleep.   • triamcinolone (ARISTOCORT) 0.1 % ointment  Apply 1 application. topically 2 times daily.   • pantoprazole (PROTONIX) 40 MG tablet Take 40 mg by mouth daily.   • fluticasone-vilanterol (BREO ELLIPTA) 100-25 MCG/INH inhaler Inhale 1 puff into the lungs daily. Do not start before July 1, 2022.     No current facility-administered medications for this visit.     Past Medical History:   Diagnosis Date   • Ambulates with cane    • Anemia 2022   • Back problem     chronic   • Cerebral infarction (CMD) 2019   • CKD (chronic kidney disease) stage 3, GFR 30-59 ml/min (CMD)     pt states stage 4 kidney disease since 2021   • Colon polyp 01/28/2022    Dr. Rowe,benign tubulovillous adenoma. recall 6 months   • Depression    • Disorder of bone and cartilage, unspecified 04/29/2011   • GERD (gastroesophageal reflux disease)    • Gout    • High cholesterol     in past   • History of blood transfusion 01/31/2022   • Metatarsal fracture     fourth and fifth metatarsal   • Palpitation    • Pap smear abnormality of cervix with ASCUS favoring benign 12/22/2021   • Pedal edema    • Systolic murmur    • Unspecified essential hypertension     in past   • Uses roller walker    • Wears glasses      Past Surgical History:   Procedure Laterality Date   • Breast surgery Left    • Colonoscopy diagnostic  01/28/2022       • D and c  1982   • Dexa bone density axial skeleton  04/29/2011       PHYSICAL EXAMINATION:  VITAL SIGNS:    Visit Vitals  /60   Pulse 85   Ht 5' 3\" (1.6 m)   Wt 80.1 kg (176 lb 9.6 oz)   LMP 12/21/1987   SpO2 98%   BMI 31.28 kg/m²     EARS, NOSE, THROAT:  Moist mucosa.  No evidence of thrush or erythema.  NECK:  No lymphadenopathy in the neck or supraclavicular area.  No neck stiffness.  CARDIOVASCULAR:  Regular rhythm.  S1, S2 normal.  No gallop or murmur.  LUNGS:  Clear to auscultation bilaterally.  ABDOMEN:  Soft, nontender, nondistended, no hepatosplenomegaly noted.  Good active bowel sounds.  LOWER EXTREMITIES:  2+ foot and ankle edema noted.   Good dorsalis pedis pulses palpable.    ASSESSMENT:  1. Bilateral lower extremity edema    2. Encounter for screening mammogram for malignant neoplasm of breast    3. Stage 3a chronic kidney disease (CMD)    4. Electrolyte imbalance    5. Urgency incontinence        PLAN:  I will add Bumex 1 mg to the Lasix she has already started yesterday.  Will use Bumex for only 5 days.  The will start her on VESIcare for the urinary issue.  Explained the side effect.  Will get some labs today to re-evaluate her kidney and the potassium which was low last time.  Refill some of her other medications.  She has seen the nephrologist and they did not do anything so she does not want to see anyone right now.  Told her to keep the leg elevated as much as possible and I will see her in 1 week    No follow-ups on file.  Orders Placed This Encounter   • Mammo Screening Bilateral   • Basic Metabolic Panel   • diclofenac (VOLTAREN) 1 % gel   • furosemide (LASIX) 20 MG tablet   • HYDROcodone-acetaminophen (NORCO) 5-325 MG per tablet   • bumetanide (BUMEX) 1 MG tablet   • solifenacin (VESICARE) 10 MG tablet    visit     no

## 2024-02-10 NOTE — H&P ADULT - NSHPREVIEWOFSYSTEMS_GEN_ALL_CORE
Constitutional: no fever, chills, night sweats  Ears: no hearing changes or ear pain,   Nose: no nasal congestion, sinus pain, or rhinorrhea  Cardio: no chest pain, orthopnea, edema, or palpitations  Resp: dyspnea, cough, wheezing positive.    GI: no nausea, vomiting, diarrhea, constipation, hematochezia, or melena  : no dysuria, urinary frequency, hematuria  MSK: no back pain, neck pain  Skin: no rash, pruritis   Neuro: no weakness, dizziness, lightheadedness, syncope   Heme/Lymph: no bruising or bleeding

## 2024-02-10 NOTE — ED PROVIDER NOTE - CLINICAL SUMMARY MEDICAL DECISION MAKING FREE TEXT BOX
68M w/ PMH HTN, HLD, CHF Hypothyroidism, Asthma, REBECCA, Gout who presents to ED w/ gradually worsening dyspnea on exertion x 1.5 months. Pt reports SOB at baseline due to history of Asthma, pt has been using his Albuterol daily, pt w/ gradually worsening SOB since diagnosis of COVID at end of December 2023, SOB worsens w/ exertion, pt now w/ some SOB intermittently at rest as well. Pt went to PMD for symptoms and was referred to ED to r/o PE. Denies fever, chills, chest pain, abdominal pain, N/V/D, dysuria, urinary frequency/urgency, extremity weakness/numbness/tingling, lightheadedness, dizziness, or headaches. Patient currently afebrile, hemodynamically stable, spO2 100%. On PE - pt well-appearing, in no acute distress, heart w/ RRR, chest symmetrical, non-labored breathing, + Mild expiratory wheezing of the bilateral lower lung lobes, abdomen soft/non-distended/non-tender to palpation. Based on history and physical, differentials include but are not limited to viral illness, COVID/Flu, electrolyte abnormality, anemia, ACS, CHF, PE. Plan to assess patient for acute pathology as listed above with Labs, EKG, CXR, CTA Chest. Will administer medications for symptomatic relief, follow-up on results, and reassess. Disposition pending workup/re-evaluation.

## 2024-02-10 NOTE — H&P ADULT - HISTORY OF PRESENT ILLNESS
Patient is a 68M with a PMH of Asthma/COPD, CHF, CAD, Glaucoma,   Gout, H/O bariatric surgery, BPH, HLD, HTN, Hypothyroidism (secondary to radioactive iodine), Legally blind, Obesity, and REBECCA on CPAP who presents to the ED for worsening dyspnea on exertion.  Patient states that at the end of last year he had COVID 19 and developed significant dyspnea.  Patient reports that now his breathing is not back to baseline.  Patients states that before infection he could walk about 5 blocks without difficulty, now has to stop after walking 1-2 blocks.  Also notes increased dyspnea when climbing stairs.  Seen his PCP yesterday who recommended he come to ED to eval for PE.  Denies history of chest pain.  States he has chronic cough that sometimes produces white phlegm.  Admits to chronic smoking history - smoked for about 25 years, quit 15 years ago.  Wife still actively smokes cigarettes.  Patient also states that he was on home oxygen for a time but that stopped around 10 years.  Follows with PCP and Pulm outpatient.  Compliant with CPAP at night.  No other complaints.  Vitals stable, SpO2 currently 100% on RA.  Labs show elevated troponins, hypokalemia, and hypomagnesimemia.  Will admit to tele.

## 2024-02-10 NOTE — H&P ADULT - NSHPLABSRESULTS_GEN_ALL_CORE
Recent Vitals  T(C): 36.6 (02-10-24 @ 11:10), Max: 36.6 (02-10-24 @ 08:57)  HR: 80 (02-10-24 @ 13:31) (68 - 80)  BP: 148/87 (02-10-24 @ 13:31) (148/87 - 157/98)  RR: 20 (02-10-24 @ 13:31) (13 - 20)  SpO2: 100% (02-10-24 @ 13:31) (97% - 100%)                        11.5   4.57  )-----------( 162      ( 10 Feb 2024 09:22 )             34.7     02-10    142  |  109<H>  |  17  ----------------------------<  95  2.9<LL>   |  30  |  1.18    Ca    8.0<L>      10 Feb 2024 09:22  Mg     1.3     02-10    TPro  7.0  /  Alb  3.4  /  TBili  1.6<H>  /  DBili  x   /  AST  30  /  ALT  31  /  AlkPhos  56  02-10    PT/INR - ( 10 Feb 2024 09:22 )   PT: 11.7 sec;   INR: 0.98 ratio         PTT - ( 10 Feb 2024 09:22 )  PTT:33.8 sec  LIVER FUNCTIONS - ( 10 Feb 2024 09:22 )  Alb: 3.4 g/dL / Pro: 7.0 gm/dL / ALK PHOS: 56 U/L / ALT: 31 U/L / AST: 30 U/L / GGT: x           Urinalysis Basic - ( 10 Feb 2024 09:22 )    Color: x / Appearance: x / SG: x / pH: x  Gluc: 95 mg/dL / Ketone: x  / Bili: x / Urobili: x   Blood: x / Protein: x / Nitrite: x   Leuk Esterase: x / RBC: x / WBC x   Sq Epi: x / Non Sq Epi: x / Bacteria: x        Home Medications:  allopurinol 100 mg oral tablet: 1 tab(s) orally 3 times a day (14 Aug 2017 14:13)  amLODIPine 5 mg oral tablet: 1 tab(s) orally once a day (14 Aug 2017 14:13)  aspirin 81 mg oral tablet: 1 tab(s) orally once a day (14 Aug 2017 14:13)  B-12 Resin 1000 mcg oral tablet: 1 tab(s) orally once a day (14 Aug 2017 14:13)  brimonidine 0.15% ophthalmic solution: 1 drop(s) to each affected eye once a day (14 Aug 2017 14:13)  colchicine 0.6 mg oral capsule: 1 cap(s) orally once a day, As Needed (14 Aug 2017 14:13)  dorzolamide 2% ophthalmic solution: 1 drop(s) to each affected eye 3 times a day (14 Aug 2017 14:13)  finasteride 5 mg oral tablet: 1 tab(s) orally once a day (14 Aug 2017 14:13)  isosorbide mononitrate 30 mg oral tablet, extended release: 1 tab(s) orally once a day (in the morning) (14 Aug 2017 14:13)  Lasix 40 mg oral tablet: 1 tab(s) orally once a day (14 Aug 2017 14:13)  levothyroxine 50 mcg (0.05 mg)/mL oral solution:  orally  (14 Aug 2017 14:13)  losartan 100 mg oral tablet: 1 tab(s) orally once a day (14 Aug 2017 14:13)  metoprolol tartrate 50 mg oral tablet: 1 tab(s) orally 2 times a day (14 Aug 2017 14:13)  Myrbetriq 50 mg oral tablet, extended release: 1 tab(s) orally once a day (14 Aug 2017 14:13)  potassium chloride 10 mEq oral tablet, extended release: 1 tab(s) orally 2 times a day (14 Aug 2017 14:13)  prednisoLONE acetate 1% ophthalmic suspension: 1 drop(s) to each affected eye once a day (14 Aug 2017 14:13)  Refresh ophthalmic solution: 1 drop(s) to each affected eye 2 times a day (14 Aug 2017 14:13)  Ventolin HFA 90 mcg/inh inhalation aerosol: 2 puff(s) inhaled 4 times a day, As Needed (14 Aug 2017 14:13)  Vitamin D3: 1 tab(s) orally once a week (14 Aug 2017 14:13)

## 2024-02-10 NOTE — PATIENT PROFILE ADULT - NSPROMEDSADMININFO_GEN_A_NUR
A refill of the medication Tamsulosin to your pharmacy.   Wants you to follow up in one year.  
no concerns

## 2024-02-10 NOTE — ED ADULT NURSE NOTE - NSFALLHARMRISKINTERV_ED_ALL_ED

## 2024-02-11 LAB
ANION GAP SERPL CALC-SCNC: 5 MMOL/L — SIGNIFICANT CHANGE UP (ref 5–17)
BUN SERPL-MCNC: 11 MG/DL — SIGNIFICANT CHANGE UP (ref 7–23)
CALCIUM SERPL-MCNC: 7.9 MG/DL — LOW (ref 8.5–10.1)
CHLORIDE SERPL-SCNC: 109 MMOL/L — HIGH (ref 96–108)
CO2 SERPL-SCNC: 28 MMOL/L — SIGNIFICANT CHANGE UP (ref 22–31)
CREAT SERPL-MCNC: 0.93 MG/DL — SIGNIFICANT CHANGE UP (ref 0.5–1.3)
EGFR: 91 ML/MIN/1.73M2 — SIGNIFICANT CHANGE UP
GLUCOSE SERPL-MCNC: 84 MG/DL — SIGNIFICANT CHANGE UP (ref 70–99)
HCT VFR BLD CALC: 31.8 % — LOW (ref 39–50)
HCV AB S/CO SERPL IA: 0.11 S/CO — SIGNIFICANT CHANGE UP (ref 0–0.99)
HCV AB SERPL-IMP: SIGNIFICANT CHANGE UP
HGB BLD-MCNC: 10.7 G/DL — LOW (ref 13–17)
MCHC RBC-ENTMCNC: 30.1 PG — SIGNIFICANT CHANGE UP (ref 27–34)
MCHC RBC-ENTMCNC: 33.6 G/DL — SIGNIFICANT CHANGE UP (ref 32–36)
MCV RBC AUTO: 89.6 FL — SIGNIFICANT CHANGE UP (ref 80–100)
NRBC # BLD: 0 /100 WBCS — SIGNIFICANT CHANGE UP (ref 0–0)
PLATELET # BLD AUTO: 147 K/UL — LOW (ref 150–400)
POTASSIUM SERPL-MCNC: 2.9 MMOL/L — CRITICAL LOW (ref 3.5–5.3)
POTASSIUM SERPL-SCNC: 2.9 MMOL/L — CRITICAL LOW (ref 3.5–5.3)
RBC # BLD: 3.55 M/UL — LOW (ref 4.2–5.8)
RBC # FLD: 14.5 % — SIGNIFICANT CHANGE UP (ref 10.3–14.5)
SODIUM SERPL-SCNC: 142 MMOL/L — SIGNIFICANT CHANGE UP (ref 135–145)
WBC # BLD: 3.67 K/UL — LOW (ref 3.8–10.5)
WBC # FLD AUTO: 3.67 K/UL — LOW (ref 3.8–10.5)

## 2024-02-11 PROCEDURE — 99232 SBSQ HOSP IP/OBS MODERATE 35: CPT

## 2024-02-11 RX ORDER — POTASSIUM CHLORIDE 20 MEQ
20 PACKET (EA) ORAL
Refills: 0 | Status: COMPLETED | OUTPATIENT
Start: 2024-02-11 | End: 2024-02-11

## 2024-02-11 RX ORDER — POTASSIUM CHLORIDE 20 MEQ
40 PACKET (EA) ORAL ONCE
Refills: 0 | Status: COMPLETED | OUTPATIENT
Start: 2024-02-11 | End: 2024-02-11

## 2024-02-11 RX ADMIN — DORZOLAMIDE HYDROCHLORIDE 1 DROP(S): 20 SOLUTION/ DROPS OPHTHALMIC at 05:39

## 2024-02-11 RX ADMIN — BRIMONIDINE TARTRATE 1 DROP(S): 2 SOLUTION/ DROPS OPHTHALMIC at 05:40

## 2024-02-11 RX ADMIN — Medication 50 MILLIEQUIVALENT(S): at 12:35

## 2024-02-11 RX ADMIN — HEPARIN SODIUM 5000 UNIT(S): 5000 INJECTION INTRAVENOUS; SUBCUTANEOUS at 05:38

## 2024-02-11 RX ADMIN — ISOSORBIDE MONONITRATE 30 MILLIGRAM(S): 60 TABLET, EXTENDED RELEASE ORAL at 11:32

## 2024-02-11 RX ADMIN — FINASTERIDE 5 MILLIGRAM(S): 5 TABLET, FILM COATED ORAL at 11:49

## 2024-02-11 RX ADMIN — Medication 81 MILLIGRAM(S): at 11:31

## 2024-02-11 RX ADMIN — Medication 40 MILLIGRAM(S): at 05:39

## 2024-02-11 RX ADMIN — BUDESONIDE AND FORMOTEROL FUMARATE DIHYDRATE 2 PUFF(S): 160; 4.5 AEROSOL RESPIRATORY (INHALATION) at 17:35

## 2024-02-11 RX ADMIN — Medication 175 MICROGRAM(S): at 05:39

## 2024-02-11 RX ADMIN — Medication 50 MILLIEQUIVALENT(S): at 11:25

## 2024-02-11 RX ADMIN — Medication 50 MILLIGRAM(S): at 17:35

## 2024-02-11 RX ADMIN — Medication 100 MILLIGRAM(S): at 11:50

## 2024-02-11 RX ADMIN — HEPARIN SODIUM 5000 UNIT(S): 5000 INJECTION INTRAVENOUS; SUBCUTANEOUS at 17:38

## 2024-02-11 RX ADMIN — Medication 50 MILLIEQUIVALENT(S): at 10:03

## 2024-02-11 RX ADMIN — Medication 40 MILLIEQUIVALENT(S): at 08:48

## 2024-02-11 RX ADMIN — BRIMONIDINE TARTRATE 1 DROP(S): 2 SOLUTION/ DROPS OPHTHALMIC at 13:19

## 2024-02-11 RX ADMIN — DORZOLAMIDE HYDROCHLORIDE 1 DROP(S): 20 SOLUTION/ DROPS OPHTHALMIC at 21:41

## 2024-02-11 RX ADMIN — BUDESONIDE AND FORMOTEROL FUMARATE DIHYDRATE 2 PUFF(S): 160; 4.5 AEROSOL RESPIRATORY (INHALATION) at 05:40

## 2024-02-11 RX ADMIN — BRIMONIDINE TARTRATE 1 DROP(S): 2 SOLUTION/ DROPS OPHTHALMIC at 21:41

## 2024-02-11 RX ADMIN — DORZOLAMIDE HYDROCHLORIDE 1 DROP(S): 20 SOLUTION/ DROPS OPHTHALMIC at 13:17

## 2024-02-11 RX ADMIN — Medication 50 MILLIGRAM(S): at 05:39

## 2024-02-11 RX ADMIN — LATANOPROST 1 DROP(S): 0.05 SOLUTION/ DROPS OPHTHALMIC; TOPICAL at 21:44

## 2024-02-11 RX ADMIN — AMLODIPINE BESYLATE 5 MILLIGRAM(S): 2.5 TABLET ORAL at 05:39

## 2024-02-11 NOTE — PROGRESS NOTE ADULT - ASSESSMENT
Patient is a 68M with a PMH of Asthma/COPD, CHF, CAD, Glaucoma,   Gout, H/O bariatric surgery, BPH, HLD, HTN, Hypothyroidism (secondary to radioactive iodine, Legally blind, Obesity, and REBECCA on CPAP who presents to the ED for worsening dyspnea on exertion.  Patient states that at the end of last year he had COVID 19 and developed significant dyspnea.  Patient reports that now his breathing is not back to baseline.  Patients states that before infection he could walk about 5 blocks without difficulty, now has to stop after walking 1-2 blocks.  Also notes increased dyspnea when climbing stairs.  Seen his PCP yesterday who recommended he come to ED to eval for PE.  Denies history of chest pain.  States he has chronic cough that sometimes produces white phlegm.  Admits to chronic smoking history - smoked for about 25 years, quit 15 years ago.  Wife still actively smokes cigarettes.  Patient also states that he was on home oxygen for a time but that stopped around 10 years.  Follows with PCP and Pulm outpatient.  Compliant with CPAP at night.  No other complaints.  Vitals stable, SpO2 currently 100% on RA.  Labs show elevated troponins, hypokalemia, and hypomagnesimemia.  Will admit to tele.       Problem/Plan - 1:  ·  Problem: Chronic dyspnea.   ·  Plan: Dyspnea likely multifactorial - COPD + REBECCA + OHS  Patient without significant rales or wheezing.  No infiltrates on imaging  CT chest: No pulmonary embolism, with limited evaluation of several subsegmental branches.  Admit to tele - eval for home O2.  Consider pulm eval  Continue symbicort, albuterol prn  CT also found lung nodules - discussed with patient and wife.  With patient's smoking history - will need monitoring.  Follows pulm outpatient.  (2/11) PT clinically stable. Replete K. PT consult    Problem/Plan - 2:  ·  Problem: COPD, severe.   ·  Plan: as per above.    Problem/Plan - 3:  ·  Problem: Troponin level elevated.   ·  Plan: Troponin elevated.  Will trend  EKG - NSR @ 70  No history of chest pain.    Problem/Plan - 4:  ·  Problem: Chronic CHF.   ·  Plan: continue PO lasix.    Problem/Plan - 5:  ·  Problem: Hypokalemia.   ·  Plan: K 2.9, being replaced in ED  Will trend labs.    Problem/Plan - 6:  ·  Problem: Hypomagnesemia.   ·  Plan: Mg 1.3, will replace.    Problem/Plan - 7:  ·  Problem: Essential hypertension.   ·  Plan: continue amlodipine, imdur, metoprolol.    Problem/Plan - 8:  ·  Problem: Glaucoma.   ·  Plan: continue eyedrops  latanoprost, brimonidine, prednisolone.    Problem/Plan - 9:  ·  Problem: BPH without urinary obstruction.   ·  Plan: tamsulosin, finasteride.    Problem/Plan - 10:  ·  Problem: Hypothyroidism.   ·  Plan; synthroid.    Problem/Plan - 11:  ·  Problem: Preventive measure.   ·  Plan: VTE prop: heparin sc q12 hrs.

## 2024-02-11 NOTE — PROGRESS NOTE ADULT - SUBJECTIVE AND OBJECTIVE BOX
Patient is a 68y old  Male who presents with a chief complaint of chronic dyspnea (10 Feb 2024 13:30)    INTERVAL HPI/OVERNIGHT EVENTS: Patients seen and examined at bedside this morning. No acute events overnight. Pt reports SOB improved. Not on oxygen at bedside.     MEDICATIONS  (STANDING):  allopurinol 100 milliGRAM(s) Oral daily  amLODIPine   Tablet 5 milliGRAM(s) Oral daily  aspirin enteric coated 81 milliGRAM(s) Oral daily  brimonidine 0.2% Ophthalmic Solution 1 Drop(s) Both EYES three times a day  budesonide 160 MICROgram(s)/formoterol 4.5 MICROgram(s) Inhaler 2 Puff(s) Inhalation two times a day  dorzolamide 2% Ophthalmic Solution 1 Drop(s) Both EYES three times a day  finasteride 5 milliGRAM(s) Oral daily  furosemide    Tablet 40 milliGRAM(s) Oral daily  heparin   Injectable 5000 Unit(s) SubCutaneous every 12 hours  isosorbide   mononitrate ER Tablet (IMDUR) 30 milliGRAM(s) Oral daily  latanoprost 0.005% Ophthalmic Solution 1 Drop(s) Both EYES at bedtime  levothyroxine 175 MICROGram(s) Oral daily  metoprolol tartrate 50 milliGRAM(s) Oral two times a day    MEDICATIONS  (PRN):  albuterol    90 MICROgram(s) HFA Inhaler 2 Puff(s) Inhalation every 6 hours PRN Shortness of Breath and/or Wheezing    Allergies    No Known Allergies    Intolerances      REVIEW OF SYSTEMS:  All other systems reviewed and are negative    Vital Signs Last 24 Hrs  T(C): 36.2 (2024 11:00), Max: 36.9 (10 Feb 2024 23:58)  T(F): 97.2 (2024 11:00), Max: 98.4 (10 Feb 2024 23:58)  HR: 81 (2024 11:00) (68 - 90)  BP: 147/97 (2024 11:00) (147/97 - 161/95)  BP(mean): 111 (10 Feb 2024 15:19) (111 - 111)  RR: 18 (2024 11:00) (16 - 23)  SpO2: 97% (2024 11:00) (96% - 100%)    Parameters below as of 2024 11:00  Patient On (Oxygen Delivery Method): room air      Daily Height in cm: 185.42 (10 Feb 2024 17:48)    Daily Weight in k.8 (2024 04:45)  I&O's Summary    10 Feb 2024 07:01  -  2024 07:00  --------------------------------------------------------  IN: 360 mL / OUT: 0 mL / NET: 360 mL      CAPILLARY BLOOD GLUCOSE        PHYSICAL EXAM:  General: patient in no acute distress, resting comfortably  Head:  Atraumatic, Normocephalic  Eyes: EOMI, PERRLA, clear sclera  Neck: Supple, thyroid nontender, non enlarged  Cardio: S1/S2 +ve, regular rate and rhythm, no M/G/R  Resp: decreased air entry, no rales or wheezing   GI: abdomen soft, nontender, non distended, no guarding, BS +ve x 4  Ext: trace pedal edema  Neuro: CN 2-12 intact, no significant motor or sensory deficits.  Skin: No rashes or lesions      Labs                          10.7   3.67  )-----------( 147      ( 2024 05:48 )             31.8     02-11    142  |  109<H>  |  11  ----------------------------<  84  2.9<LL>   |  28  |  0.93    Ca    7.9<L>      2024 05:48  Mg     1.3     02-10    TPro  7.0  /  Alb  3.4  /  TBili  1.6<H>  /  DBili  x   /  AST  30  /  ALT  31  /  AlkPhos  56  02-10    PT/INR - ( 10 Feb 2024 09:22 )   PT: 11.7 sec;   INR: 0.98 ratio         PTT - ( 10 Feb 2024 09:22 )  PTT:33.8 sec      Urinalysis Basic - ( 2024 05:48 )    Color: x / Appearance: x / SG: x / pH: x  Gluc: 84 mg/dL / Ketone: x  / Bili: x / Urobili: x   Blood: x / Protein: x / Nitrite: x   Leuk Esterase: x / RBC: x / WBC x   Sq Epi: x / Non Sq Epi: x / Bacteria: x                      Radiology and Imaging reviewed.

## 2024-02-12 ENCOUNTER — TRANSCRIPTION ENCOUNTER (OUTPATIENT)
Age: 69
End: 2024-02-12

## 2024-02-12 VITALS
TEMPERATURE: 98 F | HEART RATE: 76 BPM | DIASTOLIC BLOOD PRESSURE: 93 MMHG | RESPIRATION RATE: 18 BRPM | OXYGEN SATURATION: 97 % | SYSTOLIC BLOOD PRESSURE: 161 MMHG

## 2024-02-12 LAB
ANION GAP SERPL CALC-SCNC: 3 MMOL/L — LOW (ref 5–17)
BUN SERPL-MCNC: 18 MG/DL — SIGNIFICANT CHANGE UP (ref 7–23)
CALCIUM SERPL-MCNC: 8.2 MG/DL — LOW (ref 8.5–10.1)
CHLORIDE SERPL-SCNC: 110 MMOL/L — HIGH (ref 96–108)
CO2 SERPL-SCNC: 29 MMOL/L — SIGNIFICANT CHANGE UP (ref 22–31)
CREAT SERPL-MCNC: 1.01 MG/DL — SIGNIFICANT CHANGE UP (ref 0.5–1.3)
EGFR: 82 ML/MIN/1.73M2 — SIGNIFICANT CHANGE UP
GLUCOSE SERPL-MCNC: 88 MG/DL — SIGNIFICANT CHANGE UP (ref 70–99)
HCT VFR BLD CALC: 33.5 % — LOW (ref 39–50)
HGB BLD-MCNC: 11.1 G/DL — LOW (ref 13–17)
MAGNESIUM SERPL-MCNC: 1.5 MG/DL — LOW (ref 1.6–2.6)
MCHC RBC-ENTMCNC: 30 PG — SIGNIFICANT CHANGE UP (ref 27–34)
MCHC RBC-ENTMCNC: 33.1 G/DL — SIGNIFICANT CHANGE UP (ref 32–36)
MCV RBC AUTO: 90.5 FL — SIGNIFICANT CHANGE UP (ref 80–100)
NRBC # BLD: 0 /100 WBCS — SIGNIFICANT CHANGE UP (ref 0–0)
PHOSPHATE SERPL-MCNC: 2.9 MG/DL — SIGNIFICANT CHANGE UP (ref 2.5–4.5)
PLATELET # BLD AUTO: 177 K/UL — SIGNIFICANT CHANGE UP (ref 150–400)
POTASSIUM SERPL-MCNC: 3.5 MMOL/L — SIGNIFICANT CHANGE UP (ref 3.5–5.3)
POTASSIUM SERPL-SCNC: 3.5 MMOL/L — SIGNIFICANT CHANGE UP (ref 3.5–5.3)
RBC # BLD: 3.7 M/UL — LOW (ref 4.2–5.8)
RBC # FLD: 14.4 % — SIGNIFICANT CHANGE UP (ref 10.3–14.5)
SODIUM SERPL-SCNC: 142 MMOL/L — SIGNIFICANT CHANGE UP (ref 135–145)
TROPONIN I, HIGH SENSITIVITY RESULT: 79.8 NG/L — HIGH
WBC # BLD: 3.96 K/UL — SIGNIFICANT CHANGE UP (ref 3.8–10.5)
WBC # FLD AUTO: 3.96 K/UL — SIGNIFICANT CHANGE UP (ref 3.8–10.5)

## 2024-02-12 PROCEDURE — 99232 SBSQ HOSP IP/OBS MODERATE 35: CPT

## 2024-02-12 RX ORDER — LEVOTHYROXINE SODIUM 125 MCG
0 TABLET ORAL
Qty: 0 | Refills: 0 | DISCHARGE

## 2024-02-12 RX ORDER — MAGNESIUM SULFATE 500 MG/ML
2 VIAL (ML) INJECTION ONCE
Refills: 0 | Status: COMPLETED | OUTPATIENT
Start: 2024-02-12 | End: 2024-02-12

## 2024-02-12 RX ORDER — POTASSIUM CHLORIDE 20 MEQ
40 PACKET (EA) ORAL ONCE
Refills: 0 | Status: COMPLETED | OUTPATIENT
Start: 2024-02-12 | End: 2024-02-12

## 2024-02-12 RX ADMIN — Medication 81 MILLIGRAM(S): at 11:19

## 2024-02-12 RX ADMIN — Medication 50 MILLIGRAM(S): at 05:41

## 2024-02-12 RX ADMIN — AMLODIPINE BESYLATE 5 MILLIGRAM(S): 2.5 TABLET ORAL at 05:40

## 2024-02-12 RX ADMIN — BRIMONIDINE TARTRATE 1 DROP(S): 2 SOLUTION/ DROPS OPHTHALMIC at 13:38

## 2024-02-12 RX ADMIN — BRIMONIDINE TARTRATE 1 DROP(S): 2 SOLUTION/ DROPS OPHTHALMIC at 05:42

## 2024-02-12 RX ADMIN — BUDESONIDE AND FORMOTEROL FUMARATE DIHYDRATE 2 PUFF(S): 160; 4.5 AEROSOL RESPIRATORY (INHALATION) at 05:41

## 2024-02-12 RX ADMIN — FINASTERIDE 5 MILLIGRAM(S): 5 TABLET, FILM COATED ORAL at 11:20

## 2024-02-12 RX ADMIN — DORZOLAMIDE HYDROCHLORIDE 1 DROP(S): 20 SOLUTION/ DROPS OPHTHALMIC at 05:42

## 2024-02-12 RX ADMIN — HEPARIN SODIUM 5000 UNIT(S): 5000 INJECTION INTRAVENOUS; SUBCUTANEOUS at 05:40

## 2024-02-12 RX ADMIN — DORZOLAMIDE HYDROCHLORIDE 1 DROP(S): 20 SOLUTION/ DROPS OPHTHALMIC at 13:39

## 2024-02-12 RX ADMIN — Medication 175 MICROGRAM(S): at 05:40

## 2024-02-12 RX ADMIN — ISOSORBIDE MONONITRATE 30 MILLIGRAM(S): 60 TABLET, EXTENDED RELEASE ORAL at 11:20

## 2024-02-12 RX ADMIN — Medication 25 GRAM(S): at 10:32

## 2024-02-12 RX ADMIN — Medication 40 MILLIGRAM(S): at 05:40

## 2024-02-12 RX ADMIN — Medication 100 MILLIGRAM(S): at 11:19

## 2024-02-12 RX ADMIN — Medication 40 MILLIEQUIVALENT(S): at 10:29

## 2024-02-12 NOTE — DISCHARGE NOTE PROVIDER - ATTENDING DISCHARGE PHYSICAL EXAMINATION:
General: patient in no acute distress, resting comfortably  Head:  Atraumatic, Normocephalic  Eyes: EOMI, PERRLA, clear sclera  Neck: Supple, thyroid nontender, non enlarged  Cardio: S1/S2 +ve, regular rate and rhythm, no M/G/R  Resp: decreased air entry, no rales or wheezing   GI: abdomen soft, nontender, non distended, no guarding, BS +ve x 4  Ext: trace pedal edema

## 2024-02-12 NOTE — DISCHARGE NOTE NURSING/CASE MANAGEMENT/SOCIAL WORK - PATIENT PORTAL LINK FT
You can access the FollowMyHealth Patient Portal offered by [facility] by registering at the following website: [email or web address]. By joining [employer]’s FollowMyHealth portal, you will also be able to view your health information using other applications (apps) compatible with our system.

## 2024-02-12 NOTE — DISCHARGE NOTE PROVIDER - CARE PROVIDER_API CALL
Zacarias Geronimo  Cardiology  [address] [place] [address], [address]  [place] [address]  Phone: [phone]  Fax: ()-  Follow Up Time: 1 week

## 2024-02-12 NOTE — DISCHARGE NOTE PROVIDER - NSDCMRMEDTOKEN_GEN_ALL_CORE_FT
allopurinol 100 mg oral tablet: 1 tab(s) orally 3 times a day  amLODIPine 5 mg oral tablet: 1 tab(s) orally once a day  aspirin 81 mg oral tablet: 1 tab(s) orally once a day  B-12 Resin 1000 mcg oral tablet: 1 tab(s) orally once a day  brimonidine 0.15% ophthalmic solution: 1 drop(s) to each affected eye once a day  brimonidine 0.2% ophthalmic solution: 1 drop(s) in each eye 3 times a day  colchicine 0.6 mg oral capsule: 1 cap(s) orally once a day, As Needed  dorzolamide 2% ophthalmic solution: 1 drop(s) to each affected eye 3 times a day  finasteride 5 mg oral tablet: 1 tab(s) orally once a day  isosorbide mononitrate 30 mg oral tablet, extended release: 1 tab(s) orally once a day (in the morning)  Lasix 40 mg oral tablet: 1 tab(s) orally once a day  latanoprost 0.005% ophthalmic solution: 1 drop(s) in each affected eye once a day (at bedtime)  losartan 100 mg oral tablet: 1 tab(s) orally once a day  metoprolol tartrate 50 mg oral tablet: 1 tab(s) orally 2 times a day  Myrbetriq 50 mg oral tablet, extended release: 1 tab(s) orally once a day  oxycodone-acetaminophen 5mg-325mg oral tablet: 1 tab(s) orally every 6 hours MDD:4 tabs  potassium chloride 10 mEq oral tablet, extended release: 1 tab(s) orally 2 times a day  prednisoLONE acetate 1% ophthalmic suspension: 1 drop(s) to each affected eye once a day  Refresh ophthalmic solution: 1 drop(s) to each affected eye 2 times a day  Synthroid 175 mcg (0.175 mg) oral tablet: 1 tab(s) orally once a day  Ventolin HFA 90 mcg/inh inhalation aerosol: 2 puff(s) inhaled 4 times a day, As Needed  Vitamin D3: 1 tab(s) orally once a week

## 2024-02-12 NOTE — DISCHARGE NOTE PROVIDER - HOSPITAL COURSE
68M with a PMH of Asthma/COPD, CHF, CAD, Glaucoma, Gout, H/O bariatric surgery, BPH, HLD, HTN, Hypothyroidism (secondary to radioactive iodine, Legally blind, Obesity, and REBECCA on CPAP who presents to the ED for worsening dyspnea on exertion. Pt admitted to medical floor to r/o PE. CTA chest negative for PE, with limited evaluation of several subsegmental branches. D-dimer negative. EKG showed NSR, no ischemic changes. Pt remained on RA while hospitalized. Denied chest pain. Pt euvolemic, low suspicion for CHF exacerbation. BNP age appropriate. physical exam negative for crackles, wheezes or rales. electrolytes repleted, deficiency secondary to duiretic use. Pt to follow with PCP upon discharge. 68M with a PMH of Asthma/COPD, CHF, CAD, Glaucoma, Gout, H/O bariatric surgery, BPH, HLD, HTN, Hypothyroidism (secondary to radioactive iodine, Legally blind, Obesity, and REBECCA on CPAP who presents to the ED for worsening dyspnea on exertion. Pt admitted to medical floor to r/o PE. CTA chest negative for PE, with limited evaluation of several subsegmental branches. D-dimer negative. EKG showed NSR, no ischemic changes. Pt remained on RA while hospitalized. Denied chest pain. Pt euvolemic, low suspicion for CHF exacerbation. BNP age appropriate. physical exam negative for crackles, wheezes or rales. electrolytes repleted, deficiency secondary to duiretic use. Pt to follow with PCP upon discharge.         Patient with acute on chronic COPD exacerbation

## 2024-02-12 NOTE — DISCHARGE NOTE NURSING/CASE MANAGEMENT/SOCIAL WORK - NSDCPEFALRISK_GEN_ALL_CORE
For information on Fall & Injury Prevention, visit: [email or web address] OR  [email or web address] OR  https://www.cdc.gov/steadi/patient.html

## 2024-02-12 NOTE — PHYSICAL THERAPY INITIAL EVALUATION ADULT - ADDITIONAL COMMENTS
pt lives with family in a private home with 6-8 steps to enter and 7-9 steps to basement. pt indep in bed mob and transfers, pt able to amb w/o AD x 100ft, able to negotiate 1 flight of stairs with single HR and reciprocating gait. no gait or balance deficit noted at this time. assisted back to room and left comfortably.

## 2024-02-12 NOTE — PHYSICAL THERAPY INITIAL EVALUATION ADULT - PAIN SENSATION, RUE, REHAB EVAL
[Dear  ___] : Dear  [unfilled], [Courtesy Letter:] : I had the pleasure of seeing your patient, [unfilled], in my office today. [Please see my note below.] : Please see my note below. [Consult Closing:] : Thank you very much for allowing me to participate in the care of this patient.  If you have any questions, please do not hesitate to contact me. [Sincerely,] : Sincerely, [DrShabbir  ___] : Dr. THAKUR [FreeTextEntry3] : Demetrice Willard MD\par Professor [employer]\par Adam/Dorian\par Attending Physician\par [facility]\par [phone]\par \par  within normal limits

## 2024-02-12 NOTE — DISCHARGE NOTE PROVIDER - NSDCCPCAREPLAN_GEN_ALL_CORE_FT
PRINCIPAL DISCHARGE DIAGNOSIS  Diagnosis: Chronic dyspnea  Assessment and Plan of Treatment:      PRINCIPAL DISCHARGE DIAGNOSIS  Diagnosis: Chronic dyspnea  Assessment and Plan of Treatment:       SECONDARY DISCHARGE DIAGNOSES  Diagnosis: COPD with acute exacerbation  Assessment and Plan of Treatment: